# Patient Record
Sex: MALE | Race: WHITE | NOT HISPANIC OR LATINO | URBAN - METROPOLITAN AREA
[De-identification: names, ages, dates, MRNs, and addresses within clinical notes are randomized per-mention and may not be internally consistent; named-entity substitution may affect disease eponyms.]

---

## 2023-09-28 ENCOUNTER — EMERGENCY (EMERGENCY)
Facility: HOSPITAL | Age: 73
LOS: 1 days | Discharge: HOME | End: 2023-09-28
Attending: EMERGENCY MEDICINE | Admitting: EMERGENCY MEDICINE
Payer: MEDICARE

## 2023-09-28 VITALS
DIASTOLIC BLOOD PRESSURE: 67 MMHG | RESPIRATION RATE: 14 BRPM | SYSTOLIC BLOOD PRESSURE: 145 MMHG | HEART RATE: 71 BPM | OXYGEN SATURATION: 99 %

## 2023-09-28 VITALS
HEART RATE: 73 BPM | SYSTOLIC BLOOD PRESSURE: 174 MMHG | RESPIRATION RATE: 16 BRPM | OXYGEN SATURATION: 100 % | DIASTOLIC BLOOD PRESSURE: 65 MMHG | TEMPERATURE: 98 F

## 2023-09-28 LAB
ALBUMIN SERPL ELPH-MCNC: 4 G/DL — SIGNIFICANT CHANGE UP (ref 3.3–5)
ALP SERPL-CCNC: 90 U/L — SIGNIFICANT CHANGE UP (ref 40–120)
ALT FLD-CCNC: 18 U/L — SIGNIFICANT CHANGE UP (ref 4–41)
ANION GAP SERPL CALC-SCNC: 12 MMOL/L — SIGNIFICANT CHANGE UP (ref 7–14)
APPEARANCE UR: CLEAR — SIGNIFICANT CHANGE UP
AST SERPL-CCNC: 17 U/L — SIGNIFICANT CHANGE UP (ref 4–40)
BACTERIA # UR AUTO: NEGATIVE /HPF — SIGNIFICANT CHANGE UP
BASOPHILS # BLD AUTO: 0.02 K/UL — SIGNIFICANT CHANGE UP (ref 0–0.2)
BASOPHILS NFR BLD AUTO: 0.3 % — SIGNIFICANT CHANGE UP (ref 0–2)
BILIRUB SERPL-MCNC: 0.3 MG/DL — SIGNIFICANT CHANGE UP (ref 0.2–1.2)
BILIRUB UR-MCNC: NEGATIVE — SIGNIFICANT CHANGE UP
BUN SERPL-MCNC: 20 MG/DL — SIGNIFICANT CHANGE UP (ref 7–23)
CALCIUM SERPL-MCNC: 9 MG/DL — SIGNIFICANT CHANGE UP (ref 8.4–10.5)
CAST: 1 /LPF — SIGNIFICANT CHANGE UP (ref 0–4)
CHLORIDE SERPL-SCNC: 102 MMOL/L — SIGNIFICANT CHANGE UP (ref 98–107)
CO2 SERPL-SCNC: 28 MMOL/L — SIGNIFICANT CHANGE UP (ref 22–31)
COLOR SPEC: YELLOW — SIGNIFICANT CHANGE UP
CREAT SERPL-MCNC: 1.11 MG/DL — SIGNIFICANT CHANGE UP (ref 0.5–1.3)
DIFF PNL FLD: NEGATIVE — SIGNIFICANT CHANGE UP
EGFR: 69 ML/MIN/1.73M2 — SIGNIFICANT CHANGE UP
EOSINOPHIL # BLD AUTO: 0.01 K/UL — SIGNIFICANT CHANGE UP (ref 0–0.5)
EOSINOPHIL NFR BLD AUTO: 0.1 % — SIGNIFICANT CHANGE UP (ref 0–6)
ETHANOL SERPL-MCNC: <10 MG/DL — SIGNIFICANT CHANGE UP
FOLATE SERPL-MCNC: 19.4 NG/ML — HIGH (ref 3.1–17.5)
GLUCOSE SERPL-MCNC: 135 MG/DL — HIGH (ref 70–99)
GLUCOSE UR QL: 100 MG/DL
HCT VFR BLD CALC: 38.2 % — LOW (ref 39–50)
HGB BLD-MCNC: 12.9 G/DL — LOW (ref 13–17)
IANC: 6.5 K/UL — SIGNIFICANT CHANGE UP (ref 1.8–7.4)
IMM GRANULOCYTES NFR BLD AUTO: 0.4 % — SIGNIFICANT CHANGE UP (ref 0–0.9)
KETONES UR-MCNC: NEGATIVE MG/DL — SIGNIFICANT CHANGE UP
LEUKOCYTE ESTERASE UR-ACNC: NEGATIVE — SIGNIFICANT CHANGE UP
LYMPHOCYTES # BLD AUTO: 0.64 K/UL — LOW (ref 1–3.3)
LYMPHOCYTES # BLD AUTO: 8.3 % — LOW (ref 13–44)
MAGNESIUM SERPL-MCNC: 2 MG/DL — SIGNIFICANT CHANGE UP (ref 1.6–2.6)
MCHC RBC-ENTMCNC: 30.1 PG — SIGNIFICANT CHANGE UP (ref 27–34)
MCHC RBC-ENTMCNC: 33.8 GM/DL — SIGNIFICANT CHANGE UP (ref 32–36)
MCV RBC AUTO: 89.3 FL — SIGNIFICANT CHANGE UP (ref 80–100)
MONOCYTES # BLD AUTO: 0.49 K/UL — SIGNIFICANT CHANGE UP (ref 0–0.9)
MONOCYTES NFR BLD AUTO: 6.4 % — SIGNIFICANT CHANGE UP (ref 2–14)
NEUTROPHILS # BLD AUTO: 6.5 K/UL — SIGNIFICANT CHANGE UP (ref 1.8–7.4)
NEUTROPHILS NFR BLD AUTO: 84.5 % — HIGH (ref 43–77)
NITRITE UR-MCNC: NEGATIVE — SIGNIFICANT CHANGE UP
NRBC # BLD: 0 /100 WBCS — SIGNIFICANT CHANGE UP (ref 0–0)
NRBC # FLD: 0 K/UL — SIGNIFICANT CHANGE UP (ref 0–0)
PH UR: 5.5 — SIGNIFICANT CHANGE UP (ref 5–8)
PHOSPHATE SERPL-MCNC: 1.9 MG/DL — LOW (ref 2.5–4.5)
PLATELET # BLD AUTO: 201 K/UL — SIGNIFICANT CHANGE UP (ref 150–400)
POTASSIUM SERPL-MCNC: 4.2 MMOL/L — SIGNIFICANT CHANGE UP (ref 3.5–5.3)
POTASSIUM SERPL-SCNC: 4.2 MMOL/L — SIGNIFICANT CHANGE UP (ref 3.5–5.3)
PROT SERPL-MCNC: 5.8 G/DL — LOW (ref 6–8.3)
PROT UR-MCNC: 30 MG/DL
RBC # BLD: 4.28 M/UL — SIGNIFICANT CHANGE UP (ref 4.2–5.8)
RBC # FLD: 12.4 % — SIGNIFICANT CHANGE UP (ref 10.3–14.5)
RBC CASTS # UR COMP ASSIST: 1 /HPF — SIGNIFICANT CHANGE UP (ref 0–4)
SODIUM SERPL-SCNC: 142 MMOL/L — SIGNIFICANT CHANGE UP (ref 135–145)
SP GR SPEC: 1.02 — SIGNIFICANT CHANGE UP (ref 1–1.03)
SQUAMOUS # UR AUTO: 0 /HPF — SIGNIFICANT CHANGE UP (ref 0–5)
UROBILINOGEN FLD QL: 0.2 MG/DL — SIGNIFICANT CHANGE UP (ref 0.2–1)
VIT B12 SERPL-MCNC: 1329 PG/ML — HIGH (ref 200–900)
WBC # BLD: 7.69 K/UL — SIGNIFICANT CHANGE UP (ref 3.8–10.5)
WBC # FLD AUTO: 7.69 K/UL — SIGNIFICANT CHANGE UP (ref 3.8–10.5)
WBC UR QL: 0 /HPF — SIGNIFICANT CHANGE UP (ref 0–5)

## 2023-09-28 PROCEDURE — 70498 CT ANGIOGRAPHY NECK: CPT | Mod: 26,MA

## 2023-09-28 PROCEDURE — 70496 CT ANGIOGRAPHY HEAD: CPT | Mod: 26,MA

## 2023-09-28 PROCEDURE — 93010 ELECTROCARDIOGRAM REPORT: CPT

## 2023-09-28 PROCEDURE — 99285 EMERGENCY DEPT VISIT HI MDM: CPT

## 2023-09-28 RX ORDER — THIAMINE MONONITRATE (VIT B1) 100 MG
100 TABLET ORAL ONCE
Refills: 0 | Status: COMPLETED | OUTPATIENT
Start: 2023-09-28 | End: 2023-09-28

## 2023-09-28 RX ADMIN — Medication 100 MILLIGRAM(S): at 11:39

## 2023-09-28 NOTE — ED PROVIDER NOTE - PHYSICAL EXAMINATION
GENERAL: well appearing in no acute distress, non-toxic appearing  HEAD: normocephalic, atraumatic  HEENT: normal conjunctiva, oral mucosa moist, uvula midline, no tonsilar exudates, no JVD  CARDIAC: regular rate and rhythm, normal S1S2, no appreciable murmurs, 2+ pulses in UE/LE b/l  PULM: normal breath sounds, clear to ascultation bilaterally, no rales, rhonchi, wheezing  GI: Abd soft, nondistended, nontender, no rebound tenderness, no guarding, no rigidity  : no CVA tenderness b/l, no suprapubic tenderness  NEURO: no focal motor or sensory deficits, CN2-12 intact, normal speech, PERRLA, EOMI, normal gait, AAOx3  MSK: ROM intact, no peripheral edema, no calf tenderness b/l  SKIN: well-perfused, extremities warm, no visible rashes  PSYCH: appropriate mood and affect GENERAL: well appearing in no acute distress, non-toxic appearing  HEAD: normocephalic, atraumatic  HEENT: normal conjunctiva, oral mucosa moist, uvula midline, no tonsilar exudates, no JVD  CARDIAC: regular rate and rhythm, normal S1S2, no appreciable murmurs, 2+ pulses in UE/LE b/l  PULM: normal breath sounds, clear to ascultation bilaterally, no rales, rhonchi, wheezing  GI: Abd soft, nondistended, nontender, no rebound tenderness, no guarding, no rigidity  : no CVA tenderness b/l, no suprapubic tenderness  NEURO: no focal motor or sensory deficits, CN2-12 intact, normal speech, PERRLA, EOMI, normal gait, AAOx3  MSK: ROM intact, no peripheral edema, no calf tenderness b/l  SKIN: well-perfused, extremities warm, no visible rashes  PSYCH: appropriate mood and affect    Attending/Ray: NAD; PERRL/EOMI, non-icterus, supple, no MAXIMO, no JVD, RRR, CTAB; Abd-soft, NT/ND, no HSM; no LE edema, A&Ox3, +essential tremors, negative cerebellar signs, CN II-XII intact, nonfocal; Skin-warm/dry

## 2023-09-28 NOTE — ED PROVIDER NOTE - NSFOLLOWUPINSTRUCTIONS_ED_ALL_ED_FT
You were seen in the ED for your tremors and weakness, we did a work-up with blood work, urinalysis and CAT scan, all results have been included.  You may be suffering from an essential tremor however, we are not formally diagnosing.  Please follow-up with neurology, we have provided a referral sheet, please call to schedule your follow-up appointment.  Please also follow-up with your primary care physician, need a new primary care physician please see the top the discharge sheet for instructions on how to obtain 1.    Please seek immediate medical attention or return to ED if you have any new or worsening symptoms including but not limited to fever/chills, seizure, chest pain, shortness of breath, abdominal pain, nausea/vomiting/diarrhea, tingling/numbness, difficulty speaking.

## 2023-09-28 NOTE — ED PROVIDER NOTE - PROGRESS NOTE DETAILS
Porter Werner PGY3: Patient reassessed, results discussed, neurology referral provided, patient stating that his tremor feels less than it did when he arrived.  PCP follow-up also discussed. Return precautions discussed, verbal understanding demonstrated, all questions answered.

## 2023-09-28 NOTE — ED ADULT NURSE NOTE - NSFALLRISKINTERV_ED_ALL_ED

## 2023-09-28 NOTE — ED ADULT TRIAGE NOTE - CHIEF COMPLAINT QUOTE
Pt c/o worsening trembling and weakness x 10days. Endorsing difficulty ambulating due to weakness x 2days. Reports was seen at ER recently for similar complaint. Denies fever, chills, dizziness, numbness/tingling, blurry vision. pmhx: HTN, HLD Blood glucose 212. Pt c/o worsening trembling and weakness x 10days. Endorsing difficulty ambulating due to weakness x 2days. Reports was seen at ER recently for similar complaint. Denies fever, chills, dizziness, numbness/tingling, blurry vision, alcohol/illicit drug use. pmhx: HTN, HLD Blood glucose 212.

## 2023-09-28 NOTE — ED PROVIDER NOTE - OBJECTIVE STATEMENT
44-year-old female history of asthma, presents with wheezing shortness of breath.  Patient started feeling out of breath for distress beginning last night, took allergy medicine woke up this morning with increased distressed audible wheezing, took her nebulizer with some improvement came to the ED for evaluation.  Patient normally has triggers from environmental allergens.  Denies any recent sick contacts, fever/chills chest pain abdominal pain N/V/D has some mild left scapular pain which she contributes to previous fall. 73-year-old male with history of HTN, HLD, bilateral CEA, PAD,?  Essential tremor, presents with 10 days of worsening tremor, weakness, poor balance.  Patient seen previously for similar complaint at [facility] within the last week.  Unsure what diagnosis or treatment received there.  Feels like symptoms are worsening the last 2 days now was too shaky/weak to stand up and walk appropriately does not use cane or walker.  Denies any headache, dizziness, shortness of breath, chest pain, fever/chills, abdominal pain, N/V/D, dysuria, EtOH use, substance use. 73-year-old male with history of HTN, HLD, bilateral CEA, PAD,?  Essential tremor, presents with 10 days of worsening tremor, weakness, poor balance.  Patient seen previously for similar complaint at [facility] within the last week.  Unsure what diagnosis or treatment received there.  Feels like symptoms are worsening the last 2 days now was too shaky/weak to stand up and walk appropriately does not use cane or walker.  Denies any headache, dizziness, shortness of breath, chest pain, fever/chills, abdominal pain, N/V/D, dysuria, EtOH use, substance use.    Attending/Ray: 74 yo M as descried above including PVDs/p bilateral CEA, ?LLE PVD surgery p/w for the past 1.5 weeks of tremors and generalized weakness. Symptoms now exacerbated today including difficulty walking.

## 2023-09-28 NOTE — ED PROVIDER NOTE - CLINICAL SUMMARY MEDICAL DECISION MAKING FREE TEXT BOX
44-year-old female history of asthma, presents with wheezing shortness of breath.  Patient started feeling out of breath for distress beginning last night, took allergy medicine woke up this morning with increased distressed audible wheezing, took her nebulizer with some improvement came to the ED for evaluation.  Patient normally has triggers from environmental allergens.  Denies any recent sick contacts, fever/chills chest pain abdominal pain N/V/D has some mild left scapular pain which she contributes to previous fall. AVSS, increased RR, increased WOB, b/l mild wheeze, no peripheral edema. Acute asthma exacerbation, possible allergen vs uri etiology, r/o pna. Will get labs, ekg, cxr, give duonebs, steroids, +/- abx, and reassess. 73-year-old male with history of HTN, HLD, bilateral CEA, PAD,?  Essential tremor, presents with 10 days of worsening tremor, weakness, poor balance.  Patient seen previously for similar complaint at [facility] within the last week.  Unsure what diagnosis or treatment received there.  Feels like symptoms are worsening the last 2 days now was too shaky/weak to stand up and walk appropriately does not use cane or walker.  Denies any headache, dizziness, shortness of breath, chest pain, fever/chills, abdominal pain, N/V/D, dysuria, EtOH use, substance use.  AV SS, mildly HTN, ANO x3, CN II through XII intact, no focal motor deficits, no dysmetria, + tremor noted with active movement in bilateral upper extremities, postural trauma noted when attempting to stand, proprioception intact, coordination intact, unstable gait.  Concern for possible tremor, likely essential, versus possible cerebellar infarct given history of PAD/CEA poor balance/ataxia, rule out underlying EtOH use, possible smell of EtOH on room, versus possible vitamin deficiency.  We will get labs, EKG, B12 levels, folate levels, CTA head and neck, give meds including thiamine and reassess.

## 2023-09-28 NOTE — ED ADULT NURSE NOTE - OBJECTIVE STATEMENT
Pt reports weakness and trembling with difficulty walking worst today. Denies fever, nausea, vomiting, diarrhea, chest pain, and sob. Report dry throat and drinking a lot of water. Denies drinking etoh or drugs. Pt ambulated and noted weakness and difficulty. Denied any dizziness.

## 2023-09-28 NOTE — ED ADULT NURSE NOTE - CHIEF COMPLAINT QUOTE
Pt c/o worsening trembling and weakness x 10days. Endorsing difficulty ambulating due to weakness x 2days. Reports was seen at ER recently for similar complaint. Denies fever, chills, dizziness, numbness/tingling, blurry vision, alcohol/illicit drug use. pmhx: HTN, HLD Blood glucose 212.

## 2023-09-28 NOTE — ED PROVIDER NOTE - PATIENT PORTAL LINK FT
You can access the FollowMyHealth Patient Portal offered by [facility] by registering at the following website: [email or web address]. By joining [employer]’s FollowMyHealth portal, you will also be able to view your health information using other applications (apps) compatible with our system.

## 2023-09-30 LAB
CULTURE RESULTS: SIGNIFICANT CHANGE UP
SPECIMEN SOURCE: SIGNIFICANT CHANGE UP